# Patient Record
Sex: FEMALE | Race: OTHER | Employment: STUDENT | URBAN - METROPOLITAN AREA
[De-identification: names, ages, dates, MRNs, and addresses within clinical notes are randomized per-mention and may not be internally consistent; named-entity substitution may affect disease eponyms.]

---
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
## 2022-10-21 ENCOUNTER — APPOINTMENT (OUTPATIENT)
Dept: GENERAL RADIOLOGY | Age: 22
End: 2022-10-21
Attending: NURSE PRACTITIONER
Payer: MEDICAID

## 2022-10-21 ENCOUNTER — HOSPITAL ENCOUNTER (OUTPATIENT)
Age: 22
Discharge: HOME | End: 2022-10-21
Payer: MEDICAID

## 2022-10-21 VITALS
SYSTOLIC BLOOD PRESSURE: 132 MMHG | HEART RATE: 79 BPM | RESPIRATION RATE: 16 BRPM | BODY MASS INDEX: 21.62 KG/M2 | HEIGHT: 63 IN | TEMPERATURE: 98 F | WEIGHT: 122 LBS | OXYGEN SATURATION: 99 % | DIASTOLIC BLOOD PRESSURE: 78 MMHG

## 2022-10-21 DIAGNOSIS — R07.9 CHEST PAIN OF UNCERTAIN ETIOLOGY: Primary | ICD-10-CM

## 2022-10-21 DIAGNOSIS — R06.02 SHORTNESS OF BREATH: ICD-10-CM

## 2022-10-21 LAB
#MXD IC: 0.8 X10ˆ3/UL (ref 0.1–1)
B-HCG UR QL: NEGATIVE
BUN BLD-MCNC: 14 MG/DL (ref 7–18)
CHLORIDE BLD-SCNC: 104 MMOL/L (ref 98–112)
CO2 BLD-SCNC: 25 MMOL/L (ref 21–32)
CREAT BLD-MCNC: 0.7 MG/DL
DDIMER WHOLE BLOOD: <200 NG/ML DDU (ref ?–400)
GFR SERPLBLD BASED ON 1.73 SQ M-ARVRAT: 126 ML/MIN/1.73M2 (ref 60–?)
GLUCOSE BLD-MCNC: 95 MG/DL (ref 70–99)
HCT VFR BLD AUTO: 39.8 %
HCT VFR BLD CALC: 43 %
HGB BLD-MCNC: 13.4 G/DL
ISTAT IONIZED CALCIUM FOR CHEM 8: 1.22 MMOL/L (ref 1.12–1.32)
LYMPHOCYTES # BLD AUTO: 2.3 X10ˆ3/UL (ref 1–4)
LYMPHOCYTES NFR BLD AUTO: 33.5 %
MCH RBC QN AUTO: 30.9 PG (ref 26–34)
MCHC RBC AUTO-ENTMCNC: 33.7 G/DL (ref 31–37)
MCV RBC AUTO: 91.7 FL (ref 80–100)
MIXED CELL %: 10.8 %
NEUTROPHILS # BLD AUTO: 3.9 X10ˆ3/UL (ref 1.5–7.7)
NEUTROPHILS NFR BLD AUTO: 55.7 %
PLATELET # BLD AUTO: 349 X10ˆ3/UL (ref 150–450)
POTASSIUM BLD-SCNC: 3.9 MMOL/L (ref 3.6–5.1)
RBC # BLD AUTO: 4.34 X10ˆ6/UL
SODIUM BLD-SCNC: 139 MMOL/L (ref 136–145)
TROPONIN I BLD-MCNC: <0.02 NG/ML
WBC # BLD AUTO: 7 X10ˆ3/UL (ref 4–11)

## 2022-10-21 PROCEDURE — 93000 ELECTROCARDIOGRAM COMPLETE: CPT | Performed by: NURSE PRACTITIONER

## 2022-10-21 PROCEDURE — 80047 BASIC METABLC PNL IONIZED CA: CPT | Performed by: NURSE PRACTITIONER

## 2022-10-21 PROCEDURE — 85025 COMPLETE CBC W/AUTO DIFF WBC: CPT | Performed by: NURSE PRACTITIONER

## 2022-10-21 PROCEDURE — 71046 X-RAY EXAM CHEST 2 VIEWS: CPT | Performed by: NURSE PRACTITIONER

## 2022-10-21 PROCEDURE — 81025 URINE PREGNANCY TEST: CPT | Performed by: NURSE PRACTITIONER

## 2022-10-21 PROCEDURE — 36415 COLL VENOUS BLD VENIPUNCTURE: CPT | Performed by: NURSE PRACTITIONER

## 2022-10-21 PROCEDURE — 99214 OFFICE O/P EST MOD 30 MIN: CPT | Performed by: NURSE PRACTITIONER

## 2022-10-21 PROCEDURE — 84484 ASSAY OF TROPONIN QUANT: CPT | Performed by: NURSE PRACTITIONER

## 2024-02-21 ENCOUNTER — APPOINTMENT (OUTPATIENT)
Dept: GENERAL RADIOLOGY | Age: 24
End: 2024-02-21
Attending: NURSE PRACTITIONER
Payer: MEDICAID

## 2024-02-21 ENCOUNTER — HOSPITAL ENCOUNTER (OUTPATIENT)
Age: 24
Discharge: HOME | End: 2024-02-21
Payer: MEDICAID

## 2024-02-21 VITALS
RESPIRATION RATE: 16 BRPM | DIASTOLIC BLOOD PRESSURE: 77 MMHG | SYSTOLIC BLOOD PRESSURE: 135 MMHG | HEART RATE: 90 BPM | OXYGEN SATURATION: 100 % | TEMPERATURE: 98 F

## 2024-02-21 DIAGNOSIS — S80.02XA CONTUSION OF LEFT KNEE, INITIAL ENCOUNTER: ICD-10-CM

## 2024-02-21 DIAGNOSIS — S39.012A STRAIN OF LUMBAR REGION, INITIAL ENCOUNTER: ICD-10-CM

## 2024-02-21 DIAGNOSIS — S70.01XA CONTUSION OF RIGHT HIP, INITIAL ENCOUNTER: Primary | ICD-10-CM

## 2024-02-21 PROCEDURE — A6449 LT COMPRES BAND >=3" <5"/YD: HCPCS | Performed by: NURSE PRACTITIONER

## 2024-02-21 PROCEDURE — 72100 X-RAY EXAM L-S SPINE 2/3 VWS: CPT | Performed by: NURSE PRACTITIONER

## 2024-02-21 PROCEDURE — 99213 OFFICE O/P EST LOW 20 MIN: CPT | Performed by: NURSE PRACTITIONER

## 2024-02-21 PROCEDURE — 73502 X-RAY EXAM HIP UNI 2-3 VIEWS: CPT | Performed by: NURSE PRACTITIONER

## 2024-02-21 PROCEDURE — 73560 X-RAY EXAM OF KNEE 1 OR 2: CPT | Performed by: NURSE PRACTITIONER

## 2024-02-21 RX ORDER — IBUPROFEN 600 MG/1
600 TABLET ORAL EVERY 8 HOURS PRN
Qty: 30 TABLET | Refills: 0 | Status: SHIPPED | OUTPATIENT
Start: 2024-02-21 | End: 2024-02-28

## 2024-02-21 RX ORDER — DROSPIRENONE AND ETHINYL ESTRADIOL 0.02-3(28)
1 KIT ORAL DAILY
COMMUNITY
Start: 2024-02-14

## 2024-02-21 NOTE — ED INITIAL ASSESSMENT (HPI)
Pt reports a [place] the park. C/o left knee and right hip pain. Denies head injury or LOC.
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
## 2024-04-16 ENCOUNTER — HOSPITAL ENCOUNTER (OUTPATIENT)
Age: 24
Discharge: HOME | End: 2024-04-16
Payer: MEDICAID

## 2024-04-16 ENCOUNTER — APPOINTMENT (OUTPATIENT)
Dept: GENERAL RADIOLOGY | Age: 24
End: 2024-04-16
Attending: NURSE PRACTITIONER
Payer: MEDICAID

## 2024-04-16 VITALS
HEART RATE: 70 BPM | DIASTOLIC BLOOD PRESSURE: 77 MMHG | OXYGEN SATURATION: 97 % | TEMPERATURE: 98 F | RESPIRATION RATE: 16 BRPM | SYSTOLIC BLOOD PRESSURE: 131 MMHG

## 2024-04-16 DIAGNOSIS — S93.401A MILD SPRAIN OF RIGHT ANKLE, INITIAL ENCOUNTER: Primary | ICD-10-CM

## 2024-04-16 PROCEDURE — L4350 ANKLE CONTROL ORTHO PRE OTS: HCPCS | Performed by: NURSE PRACTITIONER

## 2024-04-16 PROCEDURE — 73610 X-RAY EXAM OF ANKLE: CPT | Performed by: NURSE PRACTITIONER

## 2024-04-16 PROCEDURE — 99213 OFFICE O/P EST LOW 20 MIN: CPT | Performed by: NURSE PRACTITIONER

## 2024-04-16 NOTE — ED INITIAL ASSESSMENT (HPI)
Pt c/o R ankle pain after it turned inwards while running yesterday.  Positive swelling noted.  No falls.

## 2024-04-16 NOTE — DISCHARGE INSTRUCTIONS
Please ice and elevate the extremity.  Take Motrin for pain.  Please follow-up with primary care provider.

## 2024-04-16 NOTE — ED PROVIDER NOTES
Patient Seen in: [facility]      History     Chief Complaint   Patient presents with    Ankle Pain     Stated Complaint: Right Foot Injury    Subjective:   HPI    This is a well appearing 22 y/o who presents for R ankle pain and swelling. Pt reports while running yesterday twisted ankle.  No numbness or tingling.  Reports she is in the [employer] and does have to run a mile over the weekend and is worried that she will not be able to.    Objective:   History reviewed. No pertinent past medical history.           History reviewed. No pertinent surgical history.             Social History     Socioeconomic History    Marital status: Single   Tobacco Use    Smoking status: Never    Smokeless tobacco: Never   Vaping Use    Vaping status: Never Used   Substance and Sexual Activity    Alcohol use: Never    Drug use: Never              Review of Systems   Musculoskeletal:  Positive for arthralgias and joint swelling.   All other systems reviewed and are negative.      Positive for stated complaint: Right Foot Injury  Other systems are as noted in HPI.  Constitutional and vital signs reviewed.      All other systems reviewed and negative except as noted above.    Physical Exam     ED Triage Vitals [04/16/24 1217]   /77   Pulse 70   Resp 16   Temp 98.4 °F (36.9 °C)   Temp src Temporal   SpO2 97 %   O2 Device None (Room air)       Current:/77   Pulse 70   Temp 98.4 °F (36.9 °C) (Temporal)   Resp 16   LMP 04/02/2024 (Approximate)   SpO2 97%         Physical Exam  Vitals and nursing note reviewed.   Constitutional:       General: She is awake. She is not in acute distress.     Appearance: Normal appearance. She is not ill-appearing, toxic-appearing or diaphoretic.   HENT:      Head: Normocephalic and atraumatic.      Right Ear: Tympanic membrane, ear canal and external ear normal.      Left Ear: Tympanic membrane, ear canal and external ear normal.      Nose: Nose normal.      Mouth/Throat:      Mouth:  Mucous membranes are moist.      Pharynx: Oropharynx is clear. Uvula midline.   Eyes:      General: Lids are normal.      Extraocular Movements: Extraocular movements intact.      Conjunctiva/sclera: Conjunctivae normal.      Pupils: Pupils are equal, round, and reactive to light.   Cardiovascular:      Rate and Rhythm: Normal rate and regular rhythm.      Pulses: Normal pulses.      Heart sounds: Normal heart sounds.   Pulmonary:      Effort: Pulmonary effort is normal.      Breath sounds: Normal breath sounds.   Musculoskeletal:      Right lower leg: Swelling present.      Comments: Swelling over the lateral malleolus. +distal pedal pulse intact. +soft compartments    Skin:     General: Skin is warm and dry.      Capillary Refill: Capillary refill takes less than 2 seconds.   Neurological:      General: No focal deficit present.      Mental Status: She is alert and oriented to person, place, and time.   Psychiatric:         Mood and Affect: Mood normal.         Behavior: Behavior normal. Behavior is cooperative.         Thought Content: Thought content normal.         Judgment: Judgment normal.         ED Course   Labs Reviewed - No data to display  Xray and re-evaluate     Xray reviewed, no acute fracture or dislocation.   XR ANKLE (MIN 3 VIEWS), RIGHT (CPT=73610)    Result Date: 4/16/2024  CONCLUSION:  1. No acute fracture or dislocation.  Bulky Achilles insertion enthesophyte.    Dictated by (CST): Varun Velasco MD on 4/16/2024 at 1:03 PM     Finalized by (CST): Varun Velasco MD on 4/16/2024 at 1:04 PM          [facility]     Medical Decision Making  Patient is well-appearing on exam, exam as noted above.  Differential diagnose include but limited to fracture versus sprain.  No evidence of fracture on x-ray.  Discussed with patient RISHI.  Follow-up with PCP.  Extremity neurovascular intact at discharge.  Patient verbalized plan of care and stated understanding.    Amount and/or Complexity of Data  Reviewed  Radiology: ordered and independent interpretation performed. Decision-making details documented in ED Course.        Disposition and Plan     Clinical Impression:  1. Mild sprain of right ankle, initial encounter         Disposition:  Discharge  4/16/2024  1:12 pm    Follow-up:  Primary Care Provider                Medications Prescribed:  Current Discharge Medication List

## 2024-04-25 ENCOUNTER — OFFICE VISIT (OUTPATIENT)
Dept: INTERNAL MEDICINE CLINIC | Facility: CLINIC | Age: 24
End: 2024-04-25

## 2024-04-25 VITALS
SYSTOLIC BLOOD PRESSURE: 120 MMHG | BODY MASS INDEX: 24.45 KG/M2 | HEART RATE: 76 BPM | WEIGHT: 138 LBS | DIASTOLIC BLOOD PRESSURE: 81 MMHG | HEIGHT: 63 IN

## 2024-04-25 DIAGNOSIS — L72.3 SEBACEOUS CYST OF AXILLA: Primary | ICD-10-CM

## 2024-04-25 PROCEDURE — 99203 OFFICE O/P NEW LOW 30 MIN: CPT | Performed by: NURSE PRACTITIONER

## 2024-04-25 PROCEDURE — G8510 SCR DEP NEG, NO PLAN REQD: HCPCS | Performed by: NURSE PRACTITIONER

## 2024-04-25 NOTE — PROGRESS NOTES
Arianna Alston is a 23 year old female.  HPI:   Pt reports a lump in her right armpit that has been there for months, was bigger previously. No drainage, no redness, no fever or chills reported. She has avoided shaving the area. She also reports since starting OCP about 1 year ago she has had a darkening to her areaola, would like it checked. She denies any other skin changes, discharge from nipple, lumps, pain.   Current Outpatient Medications   Medication Sig Dispense Refill    Drospirenone-Ethinyl Estradiol 3-0.02 MG Oral Tab Take 1 tablet by mouth daily.        History reviewed. No pertinent past medical history.   Social History:  Social History     Socioeconomic History    Marital status: Single   Tobacco Use    Smoking status: Never    Smokeless tobacco: Never   Vaping Use    Vaping status: Never Used   Substance and Sexual Activity    Alcohol use: Never    Drug use: Never        REVIEW OF SYSTEMS:   Review of Systems   Constitutional:  Negative for activity change, appetite change, fatigue, fever and unexpected weight change.   HENT:  Negative for congestion, dental problem and sore throat.    Eyes:  Negative for visual disturbance.   Respiratory:  Negative for cough, chest tightness, shortness of breath and wheezing.    Cardiovascular:  Negative for chest pain, palpitations and leg swelling.   Gastrointestinal:  Negative for abdominal pain, constipation, diarrhea, nausea and vomiting.   Endocrine: Negative.    Genitourinary:  Negative for difficulty urinating, frequency and menstrual problem.   Musculoskeletal:  Negative for arthralgias and back pain.   Skin:  Negative for color change, pallor, rash and wound.   Neurological:  Negative for dizziness, seizures, light-headedness, numbness and headaches.   Psychiatric/Behavioral:  Negative for behavioral problems, dysphoric mood and suicidal ideas. The patient is not nervous/anxious.    All other systems reviewed and are negative.         EXAM:   /81 (BP  Location: Left arm, Patient Position: Sitting, Cuff Size: adult)   Pulse 76   Ht 5' 3\" (1.6 m)   Wt 138 lb (62.6 kg)   LMP 04/02/2024 (Approximate)   BMI 24.45 kg/m²     Physical Exam  Vitals reviewed. Chaperone present: chaperone declined.   Eyes:      Conjunctiva/sclera: Conjunctivae normal.      Pupils: Pupils are equal, round, and reactive to light.   Cardiovascular:      Pulses: Normal pulses.      Heart sounds: Normal heart sounds.   Pulmonary:      Effort: Pulmonary effort is normal.      Breath sounds: Normal breath sounds.   Chest:   Breasts:     Right: No swelling, bleeding, inverted nipple, mass, nipple discharge, skin change or tenderness.      Left: No swelling, bleeding, inverted nipple, mass, nipple discharge, skin change or tenderness.      Comments: Hendrix tubercles noted bilaterally  Lymphadenopathy:      Upper Body:      Right upper body: No supraclavicular, axillary or pectoral adenopathy.      Left upper body: No supraclavicular, axillary or pectoral adenopathy.   Skin:     General: Skin is warm.      Coloration: Skin is not jaundiced.      Comments: R axillary ovoid mass, no overlying erythema, 1.5cm, firm but mobile   Neurological:      Mental Status: She is alert and oriented to person, place, and time.   Psychiatric:         Mood and Affect: Mood normal.         Judgment: Judgment normal.            ASSESSMENT AND PLAN:   1. Sebaceous cyst of axilla  -Referral to general surgeon  -Normal breast exam today, informed patient her area of concern showed hendrix tubercles. Education provided.   - Surgery Referral - In Network       The patient indicates understanding of these issues and agrees to the plan.  The patient is asked to return in 1 month for annual exam. .     The above note was creating using Dragon speech recognition technology. Please excuse any typos.

## 2024-04-30 ENCOUNTER — TELEPHONE (OUTPATIENT)
Dept: SURGERY | Facility: CLINIC | Age: 24
End: 2024-04-30

## 2024-05-01 ENCOUNTER — OFFICE VISIT (OUTPATIENT)
Dept: SURGERY | Facility: CLINIC | Age: 24
End: 2024-05-01

## 2024-05-01 VITALS — BODY MASS INDEX: 24.1 KG/M2 | HEIGHT: 63 IN | WEIGHT: 136 LBS

## 2024-05-01 DIAGNOSIS — L72.3 SEBACEOUS CYST OF AXILLA: Primary | ICD-10-CM

## 2024-05-01 PROCEDURE — 99203 OFFICE O/P NEW LOW 30 MIN: CPT | Performed by: SURGERY

## 2024-05-01 PROCEDURE — 11402 EXC TR-EXT B9+MARG 1.1-2 CM: CPT | Performed by: SURGERY

## 2024-05-01 RX ORDER — CEPHALEXIN 500 MG/1
500 CAPSULE ORAL 2 TIMES DAILY
Qty: 14 CAPSULE | Refills: 0 | Status: SHIPPED | OUTPATIENT
Start: 2024-05-01 | End: 2024-05-08

## 2024-05-01 NOTE — PATIENT INSTRUCTIONS
Ice pack as needed.  May shower in 24 hours.    Remove outer dressing in 48 hours and cover with a Band-Aid.  Tylenol or ibuprofen 600 mg for pain    Follow-up 1 week for suture removal     your prescription today and start your antibiotic today.

## 2024-05-01 NOTE — PROCEDURES
Surgery procedure note    Timeout performed and consent signed.  Right axilla prepped and draped with Betadine in sterile fashion.  1% lidocaine with epinephrine is infiltrated.  15 blade scalpel was utilized 2 cm incision is made and a ruptured cyst is encountered.  Extensive granulation tissue was curetted and excised using a 15 blade scalpel.  Compression used for hemostasis incision closed with interrupted 4-0 Prolene compression dressing applied.  She tolerated this well.

## 2024-05-01 NOTE — H&P
History and Physical      HPI     Chief Complaint   Patient presents with    Cyst     Pt states she has had a cyst in right axilla for about 1 month, size fluctuates. She drainage, pain only to pressure.       HPI  Arianna Alston is a 23 year old female who presents with sebaceous cyst of the right axilla.    No past medical history on file.  No past surgical history on file.  Current Outpatient Medications   Medication Sig Dispense Refill    Drospirenone-Ethinyl Estradiol 3-0.02 MG Oral Tab Take 1 tablet by mouth daily.       ALLERGIES  No Known Allergies    Social History     Socioeconomic History    Marital status: Single   Tobacco Use    Smoking status: Never    Smokeless tobacco: Never   Vaping Use    Vaping status: Never Used   Substance and Sexual Activity    Alcohol use: Never    Drug use: Never     Family History   Problem Relation Age of Onset    Diabetes Maternal Grandmother     Diabetes Paternal Grandmother        Review of Systems   A comprehensive 10 point review of systems was completed.  Pertinent positives and negatives noted in the the HPI.    PHYSICAL EXAM   Ht 5' 3\" (1.6 m)   Wt 136 lb (61.7 kg)   LMP 04/02/2024 (Approximate)   BMI 24.09 kg/m²  Patient's last menstrual period was 04/02/2024 (approximate).   Constitutional: appears well hydrated alert and responsive no acute distress noted  Head/Face: normocephalic  Nose/Mouth/Throat: nose and throat are clear palate is intact mucous membranes are moist no oral lesions are noted  Neck/Thyroid: neck is supple without adenopathy  Respiratory: normal to inspection lungs are clear to auscultation bilaterally normal respiratory effort    Right axillary sebaceous cyst 2 cm, ruptured and inflamed    Cardiovascular: regular rate and rhythm no murmurs, gallups, or rubs  Abdomen: soft non-tender non-distended no organomegaly noted no masses  Extremities: no edema, cyanosis, or clubbing  Neurological: exam appropriate for age reflexes and motor skills  appropriate for age      ASSESSMENT/PLAN   Assessment   Encounter Diagnosis   Name Primary?    Sebaceous cyst of axilla Yes       23 year old female with right axillary sebaceous cyst, ruptured and inflamed  We have discussed the surgical risks, benefits, alternatives, and expected recovery. We will plan office excision when convenient. All of the patient's questions have been answered to her satisfaction.       5/1/2024  Db Law MD

## 2024-05-07 ENCOUNTER — TELEPHONE (OUTPATIENT)
Dept: SURGERY | Facility: CLINIC | Age: 24
End: 2024-05-07

## 2024-05-07 NOTE — TELEPHONE ENCOUNTER
Returned call and contact made.  Patient will come early tomorrow for her appt to take sutures out.  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   KATEY

## 2024-05-07 NOTE — TELEPHONE ENCOUNTER
Patient wants to know if she is able to come for her post op early in the morning tomorrow morning on 5/8/24 at the [place] [facility], to get her stitches removed, as she needs to go to class in the afternoon?

## 2024-05-08 ENCOUNTER — OFFICE VISIT (OUTPATIENT)
Dept: SURGERY | Facility: CLINIC | Age: 24
End: 2024-05-08

## 2024-05-08 VITALS — HEIGHT: 63 IN | WEIGHT: 136 LBS | BODY MASS INDEX: 24.1 KG/M2

## 2024-05-08 DIAGNOSIS — Z98.890 POST-OPERATIVE STATE: Primary | ICD-10-CM

## 2024-05-08 PROCEDURE — 99024 POSTOP FOLLOW-UP VISIT: CPT | Performed by: SURGERY

## 2024-05-08 NOTE — PROGRESS NOTES
Postoperative Patient Follow-up      5/8/2024    [facility]      Arianna Alston is a 23 year old female postop after excision of infected cyst.  Wound healing well.      Exam  Right axilla wound clean dry intact sutures out and Steri-Strips applied      Assessment/Plan  Assessment   1. Post-operative state        Follow-up for problems         Db Law MD

## 2024-10-16 ENCOUNTER — APPOINTMENT (OUTPATIENT)
Dept: GENERAL RADIOLOGY | Age: 24
End: 2024-10-16
Attending: NURSE PRACTITIONER
Payer: MEDICAID

## 2024-10-16 ENCOUNTER — HOSPITAL ENCOUNTER (OUTPATIENT)
Age: 24
Discharge: HOME | End: 2024-10-16
Payer: MEDICAID

## 2024-10-16 VITALS
RESPIRATION RATE: 18 BRPM | DIASTOLIC BLOOD PRESSURE: 78 MMHG | HEART RATE: 69 BPM | SYSTOLIC BLOOD PRESSURE: 121 MMHG | TEMPERATURE: 98 F | OXYGEN SATURATION: 97 %

## 2024-10-16 DIAGNOSIS — S53.401A SPRAIN OF RIGHT ELBOW, INITIAL ENCOUNTER: Primary | ICD-10-CM

## 2024-10-16 PROCEDURE — 73080 X-RAY EXAM OF ELBOW: CPT | Performed by: NURSE PRACTITIONER

## 2024-10-16 PROCEDURE — 99213 OFFICE O/P EST LOW 20 MIN: CPT | Performed by: NURSE PRACTITIONER

## 2024-10-16 PROCEDURE — A6448 LT COMPRES BAND <3"/YD: HCPCS | Performed by: NURSE PRACTITIONER

## 2024-10-16 NOTE — ED PROVIDER NOTES
Patient Seen in: [facility]      History     Chief Complaint   Patient presents with    Elbow Pain     Stated Complaint: possible elbow injury  Subjective:   HPI    This is a well-appearing 24-year-old who presents with left elbow pain after she injured it 2 days ago while wrestling.  Patient states she hyperextended her elbow.  Since then has had pain with range of motion.  No numbness or tingling.  Has not attempted any over-the-counter medication.    Objective:   History reviewed. No pertinent past medical history.         History reviewed. No pertinent surgical history.           Social History     Socioeconomic History    Marital status: Single   Tobacco Use    Smoking status: Never    Smokeless tobacco: Never   Vaping Use    Vaping status: Never Used   Substance and Sexual Activity    Alcohol use: Never    Drug use: Never            Review of Systems   All other systems reviewed and are negative.      Positive for stated complaint: Elbow Pain    Other systems are as noted in HPI.  Constitutional and vital signs reviewed.      All other systems reviewed and negative except as noted above.    Physical Exam     ED Triage Vitals [10/16/24 1045]   /78   Pulse 69   Resp 18   Temp 98.2 °F (36.8 °C)   Temp src Temporal   SpO2 97 %   O2 Device None (Room air)     Current:/78   Pulse 69   Temp 98.2 °F (36.8 °C) (Temporal)   Resp 18   LMP 10/13/2024 (Approximate)   SpO2 97%     Physical Exam  Vitals and nursing note reviewed.   Constitutional:       General: She is awake. She is not in acute distress.     Appearance: Normal appearance. She is not ill-appearing, toxic-appearing or diaphoretic.   HENT:      Head: Normocephalic and atraumatic.      Right Ear: Tympanic membrane, ear canal and external ear normal.      Left Ear: Tympanic membrane, ear canal and external ear normal.      Nose: Nose normal.      Mouth/Throat:      Mouth: Mucous membranes are moist.      Pharynx: Oropharynx is clear.  Uvula midline.   Eyes:      General: Lids are normal.      Extraocular Movements: Extraocular movements intact.      Conjunctiva/sclera: Conjunctivae normal.      Pupils: Pupils are equal, round, and reactive to light.   Cardiovascular:      Rate and Rhythm: Normal rate and regular rhythm.      Pulses: Normal pulses.      Heart sounds: Normal heart sounds.   Pulmonary:      Effort: Pulmonary effort is normal.      Breath sounds: Normal breath sounds and air entry. No stridor or decreased air movement.   Musculoskeletal:      Left elbow: Tenderness present in medial epicondyle.      Comments: Tenderness noted over the medial epicondyle.  Worse with extension and flexion.  No swelling.  Soft compartments.  Distal radial pulse intact   Skin:     General: Skin is warm and dry.      Capillary Refill: Capillary refill takes less than 2 seconds.   Neurological:      General: No focal deficit present.      Mental Status: She is alert and oriented to person, place, and time.   Psychiatric:         Mood and Affect: Mood normal.         Behavior: Behavior normal. Behavior is cooperative.         Thought Content: Thought content normal.         Judgment: Judgment normal.       ED Course   X-ray reviewed, normal examination.   Ace wrap applied to the elbow.  X-ray reviewed, normal exam for no evidence of fracture  XR ELBOW, COMPLETE (MIN 3 VIEWS), LEFT (CPT=73080)    Result Date: 10/16/2024  CONCLUSION: Normal examination.     Dictated by (CST): Hany Miller MD on 10/16/2024 at 11:23 AM     Finalized by (CST): Hany Miller MD on 10/16/2024 at 11:25 AM         Labs Reviewed - No data to display    MDM     Medical Decision Making  Differential diagnoses reflecting the complexity of care include but are not limited to elbow strain, soft tissue contusion, fracture.    Comorbidities that add complexity to management include: n/a  History obtained by an independent source was from: n/a  Discussions of management was done with:  n/a  My independent interpretations of studies include: xray reviewed, normal. Personally reviewed the images   Shared decision making was done by: patient and myself   Patient is well appearing, non-toxic and in no acute distress.  Vital signs are stable.  Discussed the x-ray findings with the patient.  We discussed RICE.  Ace wrap applied.  I have given her orthopedic follow-up as well.  Extremity neurovascular intact at discharge    Problems Addressed:  Sprain of right elbow, initial encounter: acute illness or injury    Amount and/or Complexity of Data Reviewed  Radiology: ordered and independent interpretation performed. Decision-making details documented in ED Course.  ECG/medicine tests: ordered and independent interpretation performed. Decision-making details documented in ED Course.    Risk  OTC drugs.        Disposition and Plan     Clinical Impression:  1. Sprain of right elbow, initial encounter         Disposition:  Discharge  10/16/2024 11:34 am    Follow-up:  Arias Martins MD  [address] [address]  [phone]                Medications Prescribed:  Discharge Medication List as of 10/16/2024 11:41 AM             Note to patient: The [employer] act makes medical notes like these available to patients in the interest of transparency.  However, be advised this medical document and is intended as peer to peer communication.  It is read the medical language and may contain abbreviations or verbiage that are unfamiliar.  It may appear blunt or direct.  Medical documents are intended to carry relevant information, fax is evident and the clinical opinion of the practitioner.    This note was prepared using Dragon Medical voice recognition dictation software.  As a result, errors may occur.  When identified, these errors have been corrected.  While every attempt is made to correct errors during dictation, discrepancies may still exist.    Dilma Hopkins,  APRN  10/16/2024  11:06 AM

## 2024-10-16 NOTE — ED INITIAL ASSESSMENT (HPI)
Pt with L elbow pain after she injured it 2 days ago while wrestling. Pt rated pain 5/10 at rest, worsens with movement.

## (undated) NOTE — LETTER
Date & Time: 10/16/2024, 11:39 AM  Patient: Arianna Alston  Encounter Provider(s):    Dilma Hopkins APRN       To Whom It May Concern:    Arianna Alston was seen and treated in our department on 10/16/2024. She  cannot perform heavy lifting for 72 hours .    If you have any questions or concerns, please do not hesitate to call.        _____________________________  Physician/APC Signature

## (undated) NOTE — LETTER
Date & Time: 4/16/2024, 1:12 PM  Patient: Arianna Alston  Encounter Provider(s):    Dilma Hopkins APRN       To Whom It May Concern:    Arianna Alston was seen and treated in our department on 4/16/2024. She cannot return to sports, running for the next 7 days (4/23/24)    If you have any questions or concerns, please do not hesitate to call.        _____________________________  Physician/APC Signature

## (undated) NOTE — LETTER
Date & Time: 10/21/2022, 4:02 PM  [facility]  Encounter Provider(s):    TOÑA Riggs       To Whom It May Concern:    [facility] was seen and treated in our department on 10/21/2022. She should not return to work until 10/24/2022. If you have any questions or concerns, please do not hesitate to call.       ALISTAIR Sotelo  _____________________________  [id number] Signature